# Patient Record
Sex: FEMALE | Race: WHITE | NOT HISPANIC OR LATINO | Employment: UNEMPLOYED | URBAN - METROPOLITAN AREA
[De-identification: names, ages, dates, MRNs, and addresses within clinical notes are randomized per-mention and may not be internally consistent; named-entity substitution may affect disease eponyms.]

---

## 2024-07-09 ENCOUNTER — HOSPITAL ENCOUNTER (EMERGENCY)
Facility: MEDICAL CENTER | Age: 64
End: 2024-07-10
Attending: STUDENT IN AN ORGANIZED HEALTH CARE EDUCATION/TRAINING PROGRAM
Payer: OTHER MISCELLANEOUS

## 2024-07-09 ENCOUNTER — APPOINTMENT (OUTPATIENT)
Dept: RADIOLOGY | Facility: MEDICAL CENTER | Age: 64
End: 2024-07-09
Attending: STUDENT IN AN ORGANIZED HEALTH CARE EDUCATION/TRAINING PROGRAM
Payer: OTHER MISCELLANEOUS

## 2024-07-09 DIAGNOSIS — R41.82 ALTERED MENTAL STATUS, UNSPECIFIED ALTERED MENTAL STATUS TYPE: ICD-10-CM

## 2024-07-09 DIAGNOSIS — F10.920 ALCOHOLIC INTOXICATION WITHOUT COMPLICATION (HCC): ICD-10-CM

## 2024-07-09 DIAGNOSIS — E87.6 HYPOKALEMIA: ICD-10-CM

## 2024-07-09 DIAGNOSIS — R09.02 HYPOXIA: ICD-10-CM

## 2024-07-09 DIAGNOSIS — F12.90 MARIJUANA USE: ICD-10-CM

## 2024-07-09 LAB
ALBUMIN SERPL BCP-MCNC: 3.6 G/DL (ref 3.2–4.9)
ALBUMIN/GLOB SERPL: 1.7 G/DL
ALP SERPL-CCNC: 84 U/L (ref 30–99)
ALT SERPL-CCNC: 23 U/L (ref 2–50)
AMPHET UR QL SCN: NEGATIVE
ANION GAP SERPL CALC-SCNC: 17 MMOL/L (ref 7–16)
APPEARANCE UR: CLEAR
AST SERPL-CCNC: 34 U/L (ref 12–45)
BACTERIA #/AREA URNS HPF: NEGATIVE /HPF
BARBITURATES UR QL SCN: NEGATIVE
BASOPHILS # BLD AUTO: 0.6 % (ref 0–1.8)
BASOPHILS # BLD: 0.04 K/UL (ref 0–0.12)
BENZODIAZ UR QL SCN: NEGATIVE
BILIRUB SERPL-MCNC: 0.2 MG/DL (ref 0.1–1.5)
BILIRUB UR QL STRIP.AUTO: NEGATIVE
BUN SERPL-MCNC: 9 MG/DL (ref 8–22)
BZE UR QL SCN: NEGATIVE
CALCIUM ALBUM COR SERPL-MCNC: 8.9 MG/DL (ref 8.5–10.5)
CALCIUM SERPL-MCNC: 8.6 MG/DL (ref 8.5–10.5)
CANNABINOIDS UR QL SCN: NEGATIVE
CHLORIDE SERPL-SCNC: 105 MMOL/L (ref 96–112)
CO2 SERPL-SCNC: 21 MMOL/L (ref 20–33)
COLOR UR: YELLOW
CREAT SERPL-MCNC: 0.85 MG/DL (ref 0.5–1.4)
EKG IMPRESSION: NORMAL
EOSINOPHIL # BLD AUTO: 0.11 K/UL (ref 0–0.51)
EOSINOPHIL NFR BLD: 1.7 % (ref 0–6.9)
EPI CELLS #/AREA URNS HPF: NEGATIVE /HPF
ERYTHROCYTE [DISTWIDTH] IN BLOOD BY AUTOMATED COUNT: 44.4 FL (ref 35.9–50)
ETHANOL BLD-MCNC: 278.1 MG/DL
FENTANYL UR QL: NEGATIVE
GFR SERPLBLD CREATININE-BSD FMLA CKD-EPI: 76 ML/MIN/1.73 M 2
GLOBULIN SER CALC-MCNC: 2.1 G/DL (ref 1.9–3.5)
GLUCOSE BLD STRIP.AUTO-MCNC: 112 MG/DL (ref 65–99)
GLUCOSE SERPL-MCNC: 102 MG/DL (ref 65–99)
GLUCOSE UR STRIP.AUTO-MCNC: NEGATIVE MG/DL
HCG SERPL QL: NEGATIVE
HCT VFR BLD AUTO: 40 % (ref 37–47)
HGB BLD-MCNC: 13.2 G/DL (ref 12–16)
HYALINE CASTS #/AREA URNS LPF: ABNORMAL /LPF
IMM GRANULOCYTES # BLD AUTO: 0.03 K/UL (ref 0–0.11)
IMM GRANULOCYTES NFR BLD AUTO: 0.5 % (ref 0–0.9)
KETONES UR STRIP.AUTO-MCNC: NEGATIVE MG/DL
LEUKOCYTE ESTERASE UR QL STRIP.AUTO: ABNORMAL
LYMPHOCYTES # BLD AUTO: 1.99 K/UL (ref 1–4.8)
LYMPHOCYTES NFR BLD: 31 % (ref 22–41)
MCH RBC QN AUTO: 29.6 PG (ref 27–33)
MCHC RBC AUTO-ENTMCNC: 33 G/DL (ref 32.2–35.5)
MCV RBC AUTO: 89.7 FL (ref 81.4–97.8)
METHADONE UR QL SCN: NEGATIVE
MICRO URNS: ABNORMAL
MONOCYTES # BLD AUTO: 0.61 K/UL (ref 0–0.85)
MONOCYTES NFR BLD AUTO: 9.5 % (ref 0–13.4)
NEUTROPHILS # BLD AUTO: 3.64 K/UL (ref 1.82–7.42)
NEUTROPHILS NFR BLD: 56.7 % (ref 44–72)
NITRITE UR QL STRIP.AUTO: NEGATIVE
NRBC # BLD AUTO: 0 K/UL
NRBC BLD-RTO: 0 /100 WBC (ref 0–0.2)
OPIATES UR QL SCN: NEGATIVE
OXYCODONE UR QL SCN: NEGATIVE
PCP UR QL SCN: NEGATIVE
PH UR STRIP.AUTO: 5.5 [PH] (ref 5–8)
PLATELET # BLD AUTO: 245 K/UL (ref 164–446)
PMV BLD AUTO: 9.2 FL (ref 9–12.9)
POTASSIUM SERPL-SCNC: 3.3 MMOL/L (ref 3.6–5.5)
PROPOXYPH UR QL SCN: NEGATIVE
PROT SERPL-MCNC: 5.7 G/DL (ref 6–8.2)
PROT UR QL STRIP: NEGATIVE MG/DL
RBC # BLD AUTO: 4.46 M/UL (ref 4.2–5.4)
RBC # URNS HPF: ABNORMAL /HPF
RBC UR QL AUTO: NEGATIVE
SODIUM SERPL-SCNC: 143 MMOL/L (ref 135–145)
SP GR UR STRIP.AUTO: 1.01
UROBILINOGEN UR STRIP.AUTO-MCNC: 0.2 MG/DL
WBC # BLD AUTO: 6.4 K/UL (ref 4.8–10.8)
WBC #/AREA URNS HPF: ABNORMAL /HPF

## 2024-07-09 PROCEDURE — 700117 HCHG RX CONTRAST REV CODE 255: Performed by: STUDENT IN AN ORGANIZED HEALTH CARE EDUCATION/TRAINING PROGRAM

## 2024-07-09 PROCEDURE — 82077 ASSAY SPEC XCP UR&BREATH IA: CPT

## 2024-07-09 PROCEDURE — 93005 ELECTROCARDIOGRAM TRACING: CPT | Performed by: STUDENT IN AN ORGANIZED HEALTH CARE EDUCATION/TRAINING PROGRAM

## 2024-07-09 PROCEDURE — 71045 X-RAY EXAM CHEST 1 VIEW: CPT

## 2024-07-09 PROCEDURE — 70498 CT ANGIOGRAPHY NECK: CPT

## 2024-07-09 PROCEDURE — 0042T CT-CEREBRAL PERFUSION ANALYSIS: CPT

## 2024-07-09 PROCEDURE — 85025 COMPLETE CBC W/AUTO DIFF WBC: CPT

## 2024-07-09 PROCEDURE — 99285 EMERGENCY DEPT VISIT HI MDM: CPT

## 2024-07-09 PROCEDURE — 36415 COLL VENOUS BLD VENIPUNCTURE: CPT

## 2024-07-09 PROCEDURE — 80053 COMPREHEN METABOLIC PANEL: CPT

## 2024-07-09 PROCEDURE — 82962 GLUCOSE BLOOD TEST: CPT

## 2024-07-09 PROCEDURE — 84703 CHORIONIC GONADOTROPIN ASSAY: CPT

## 2024-07-09 PROCEDURE — 80307 DRUG TEST PRSMV CHEM ANLYZR: CPT

## 2024-07-09 PROCEDURE — 93005 ELECTROCARDIOGRAM TRACING: CPT

## 2024-07-09 PROCEDURE — 70496 CT ANGIOGRAPHY HEAD: CPT

## 2024-07-09 PROCEDURE — 81001 URINALYSIS AUTO W/SCOPE: CPT

## 2024-07-09 RX ADMIN — IOHEXOL 40 ML: 350 INJECTION, SOLUTION INTRAVENOUS at 21:56

## 2024-07-09 RX ADMIN — IOHEXOL 80 ML: 350 INJECTION, SOLUTION INTRAVENOUS at 21:55

## 2024-07-10 VITALS
DIASTOLIC BLOOD PRESSURE: 65 MMHG | TEMPERATURE: 98 F | WEIGHT: 180 LBS | RESPIRATION RATE: 14 BRPM | HEART RATE: 64 BPM | SYSTOLIC BLOOD PRESSURE: 170 MMHG | OXYGEN SATURATION: 94 % | BODY MASS INDEX: 29.99 KG/M2 | HEIGHT: 65 IN

## 2024-07-10 RX ORDER — POTASSIUM CHLORIDE 1.5 G/1.58G
20 POWDER, FOR SOLUTION ORAL 2 TIMES DAILY
Qty: 10 PACKET | Refills: 0 | Status: SHIPPED | OUTPATIENT
Start: 2024-07-10 | End: 2024-07-15

## 2024-07-19 ENCOUNTER — PHARMACY VISIT (OUTPATIENT)
Dept: PHARMACY | Facility: MEDICAL CENTER | Age: 64
End: 2024-07-19
Payer: COMMERCIAL

## 2024-07-19 PROCEDURE — RXMED WILLOW AMBULATORY MEDICATION CHARGE: Performed by: NURSE PRACTITIONER

## 2024-07-19 RX ORDER — PAROXETINE HYDROCHLORIDE 20 MG/1
TABLET, FILM COATED ORAL
Qty: 10 TABLET | Refills: 0 | OUTPATIENT
Start: 2024-07-19 | End: 2024-07-29

## 2024-07-19 RX ORDER — ESCITALOPRAM OXALATE 10 MG/1
10 TABLET ORAL DAILY
Qty: 30 TABLET | Refills: 1 | OUTPATIENT
Start: 2024-07-19

## 2024-07-19 RX ORDER — HYDROCHLOROTHIAZIDE 25 MG/1
25 TABLET ORAL DAILY
Qty: 30 TABLET | Refills: 1 | OUTPATIENT
Start: 2024-07-19

## 2024-07-22 ENCOUNTER — PHARMACY VISIT (OUTPATIENT)
Dept: PHARMACY | Facility: MEDICAL CENTER | Age: 64
End: 2024-07-22
Payer: COMMERCIAL

## 2024-07-22 PROCEDURE — RXMED WILLOW AMBULATORY MEDICATION CHARGE: Performed by: NURSE PRACTITIONER

## 2024-07-25 PROCEDURE — RXMED WILLOW AMBULATORY MEDICATION CHARGE: Performed by: NURSE PRACTITIONER

## 2024-07-26 ENCOUNTER — HOSPITAL ENCOUNTER (OUTPATIENT)
Facility: MEDICAL CENTER | Age: 64
End: 2024-07-27
Attending: EMERGENCY MEDICINE | Admitting: STUDENT IN AN ORGANIZED HEALTH CARE EDUCATION/TRAINING PROGRAM
Payer: MEDICARE

## 2024-07-26 ENCOUNTER — PHARMACY VISIT (OUTPATIENT)
Dept: PHARMACY | Facility: MEDICAL CENTER | Age: 64
End: 2024-07-26
Payer: COMMERCIAL

## 2024-07-26 ENCOUNTER — APPOINTMENT (OUTPATIENT)
Dept: RADIOLOGY | Facility: MEDICAL CENTER | Age: 64
End: 2024-07-26
Payer: MEDICARE

## 2024-07-26 DIAGNOSIS — R06.00 DYSPNEA, UNSPECIFIED TYPE: ICD-10-CM

## 2024-07-26 DIAGNOSIS — R94.31 ABNORMAL EKG: ICD-10-CM

## 2024-07-26 PROBLEM — F10.10 ALCOHOL ABUSE: Status: ACTIVE | Noted: 2024-07-26

## 2024-07-26 LAB
ALBUMIN SERPL BCP-MCNC: 4.1 G/DL (ref 3.2–4.9)
ALBUMIN/GLOB SERPL: 1.5 G/DL
ALP SERPL-CCNC: 98 U/L (ref 30–99)
ALT SERPL-CCNC: 17 U/L (ref 2–50)
ANION GAP SERPL CALC-SCNC: 16 MMOL/L (ref 7–16)
AST SERPL-CCNC: 24 U/L (ref 12–45)
BASOPHILS # BLD AUTO: 0.4 % (ref 0–1.8)
BASOPHILS # BLD: 0.03 K/UL (ref 0–0.12)
BILIRUB SERPL-MCNC: 0.2 MG/DL (ref 0.1–1.5)
BUN SERPL-MCNC: 14 MG/DL (ref 8–22)
CALCIUM ALBUM COR SERPL-MCNC: 10 MG/DL (ref 8.5–10.5)
CALCIUM SERPL-MCNC: 10.1 MG/DL (ref 8.5–10.5)
CHLORIDE SERPL-SCNC: 103 MMOL/L (ref 96–112)
CO2 SERPL-SCNC: 22 MMOL/L (ref 20–33)
CREAT SERPL-MCNC: 0.98 MG/DL (ref 0.5–1.4)
D DIMER PPP IA.FEU-MCNC: 0.44 UG/ML (FEU) (ref 0–0.5)
EKG IMPRESSION: NORMAL
EOSINOPHIL # BLD AUTO: 0.05 K/UL (ref 0–0.51)
EOSINOPHIL NFR BLD: 0.6 % (ref 0–6.9)
ERYTHROCYTE [DISTWIDTH] IN BLOOD BY AUTOMATED COUNT: 45.1 FL (ref 35.9–50)
FLUAV RNA SPEC QL NAA+PROBE: NEGATIVE
FLUBV RNA SPEC QL NAA+PROBE: NEGATIVE
GFR SERPLBLD CREATININE-BSD FMLA CKD-EPI: 64 ML/MIN/1.73 M 2
GLOBULIN SER CALC-MCNC: 2.8 G/DL (ref 1.9–3.5)
GLUCOSE SERPL-MCNC: 141 MG/DL (ref 65–99)
HCT VFR BLD AUTO: 44.8 % (ref 37–47)
HGB BLD-MCNC: 14.8 G/DL (ref 12–16)
IMM GRANULOCYTES # BLD AUTO: 0.04 K/UL (ref 0–0.11)
IMM GRANULOCYTES NFR BLD AUTO: 0.5 % (ref 0–0.9)
LYMPHOCYTES # BLD AUTO: 1.22 K/UL (ref 1–4.8)
LYMPHOCYTES NFR BLD: 15.8 % (ref 22–41)
MCH RBC QN AUTO: 29.3 PG (ref 27–33)
MCHC RBC AUTO-ENTMCNC: 33 G/DL (ref 32.2–35.5)
MCV RBC AUTO: 88.7 FL (ref 81.4–97.8)
MONOCYTES # BLD AUTO: 0.49 K/UL (ref 0–0.85)
MONOCYTES NFR BLD AUTO: 6.3 % (ref 0–13.4)
NEUTROPHILS # BLD AUTO: 5.9 K/UL (ref 1.82–7.42)
NEUTROPHILS NFR BLD: 76.4 % (ref 44–72)
NRBC # BLD AUTO: 0 K/UL
NRBC BLD-RTO: 0 /100 WBC (ref 0–0.2)
NT-PROBNP SERPL IA-MCNC: 62 PG/ML (ref 0–125)
PLATELET # BLD AUTO: 325 K/UL (ref 164–446)
PMV BLD AUTO: 9.8 FL (ref 9–12.9)
POTASSIUM SERPL-SCNC: 3.4 MMOL/L (ref 3.6–5.5)
PROT SERPL-MCNC: 6.9 G/DL (ref 6–8.2)
RBC # BLD AUTO: 5.05 M/UL (ref 4.2–5.4)
RSV RNA SPEC QL NAA+PROBE: NEGATIVE
SARS-COV-2 RNA RESP QL NAA+PROBE: NOTDETECTED
SODIUM SERPL-SCNC: 141 MMOL/L (ref 135–145)
SPECIMEN SOURCE: NORMAL
TROPONIN T SERPL-MCNC: 7 NG/L (ref 6–19)
TROPONIN T SERPL-MCNC: 8 NG/L (ref 6–19)
WBC # BLD AUTO: 7.7 K/UL (ref 4.8–10.8)

## 2024-07-26 PROCEDURE — 96372 THER/PROPH/DIAG INJ SC/IM: CPT

## 2024-07-26 PROCEDURE — 85379 FIBRIN DEGRADATION QUANT: CPT

## 2024-07-26 PROCEDURE — A9270 NON-COVERED ITEM OR SERVICE: HCPCS

## 2024-07-26 PROCEDURE — 36415 COLL VENOUS BLD VENIPUNCTURE: CPT

## 2024-07-26 PROCEDURE — G0378 HOSPITAL OBSERVATION PER HR: HCPCS

## 2024-07-26 PROCEDURE — 83880 ASSAY OF NATRIURETIC PEPTIDE: CPT

## 2024-07-26 PROCEDURE — 700102 HCHG RX REV CODE 250 W/ 637 OVERRIDE(OP): Performed by: EMERGENCY MEDICINE

## 2024-07-26 PROCEDURE — 99221 1ST HOSP IP/OBS SF/LOW 40: CPT

## 2024-07-26 PROCEDURE — 93005 ELECTROCARDIOGRAM TRACING: CPT

## 2024-07-26 PROCEDURE — 700111 HCHG RX REV CODE 636 W/ 250 OVERRIDE (IP): Mod: JZ

## 2024-07-26 PROCEDURE — A9270 NON-COVERED ITEM OR SERVICE: HCPCS | Performed by: EMERGENCY MEDICINE

## 2024-07-26 PROCEDURE — 85025 COMPLETE CBC W/AUTO DIFF WBC: CPT

## 2024-07-26 PROCEDURE — 84484 ASSAY OF TROPONIN QUANT: CPT

## 2024-07-26 PROCEDURE — 700102 HCHG RX REV CODE 250 W/ 637 OVERRIDE(OP)

## 2024-07-26 PROCEDURE — 80053 COMPREHEN METABOLIC PANEL: CPT

## 2024-07-26 PROCEDURE — 0241U HCHG SARS-COV-2 COVID-19 NFCT DS RESP RNA 4 TRGT MIC: CPT

## 2024-07-26 PROCEDURE — 71045 X-RAY EXAM CHEST 1 VIEW: CPT

## 2024-07-26 PROCEDURE — 99285 EMERGENCY DEPT VISIT HI MDM: CPT

## 2024-07-26 RX ORDER — HYDROCHLOROTHIAZIDE 25 MG/1
25 TABLET ORAL DAILY
Status: DISCONTINUED | OUTPATIENT
Start: 2024-07-27 | End: 2024-07-27 | Stop reason: HOSPADM

## 2024-07-26 RX ORDER — HYDROXYZINE PAMOATE 25 MG/1
25 CAPSULE ORAL 3 TIMES DAILY PRN
COMMUNITY

## 2024-07-26 RX ORDER — LORAZEPAM 1 MG/1
0.5 TABLET ORAL ONCE
Status: COMPLETED | OUTPATIENT
Start: 2024-07-26 | End: 2024-07-26

## 2024-07-26 RX ORDER — ASPIRIN 300 MG/1
300 SUPPOSITORY RECTAL DAILY
Status: DISCONTINUED | OUTPATIENT
Start: 2024-07-27 | End: 2024-07-27 | Stop reason: HOSPADM

## 2024-07-26 RX ORDER — ASPIRIN 81 MG/1
324 TABLET, CHEWABLE ORAL DAILY
Status: DISCONTINUED | OUTPATIENT
Start: 2024-07-27 | End: 2024-07-27 | Stop reason: HOSPADM

## 2024-07-26 RX ORDER — METOPROLOL TARTRATE 25 MG/1
25 TABLET, FILM COATED ORAL TWICE DAILY
Status: DISCONTINUED | OUTPATIENT
Start: 2024-07-26 | End: 2024-07-27 | Stop reason: HOSPADM

## 2024-07-26 RX ORDER — ASPIRIN 325 MG
325 TABLET ORAL DAILY
Status: DISCONTINUED | OUTPATIENT
Start: 2024-07-27 | End: 2024-07-27 | Stop reason: HOSPADM

## 2024-07-26 RX ORDER — ALBUTEROL SULFATE 90 UG/1
2 AEROSOL, METERED RESPIRATORY (INHALATION) ONCE
Status: COMPLETED | OUTPATIENT
Start: 2024-07-26 | End: 2024-07-26

## 2024-07-26 RX ORDER — AMINOPHYLLINE 25 MG/ML
100 INJECTION, SOLUTION INTRAVENOUS
Status: DISCONTINUED | OUTPATIENT
Start: 2024-07-26 | End: 2024-07-27 | Stop reason: HOSPADM

## 2024-07-26 RX ORDER — ASPIRIN 325 MG
325 TABLET ORAL ONCE
Status: COMPLETED | OUTPATIENT
Start: 2024-07-26 | End: 2024-07-26

## 2024-07-26 RX ORDER — ENOXAPARIN SODIUM 100 MG/ML
40 INJECTION SUBCUTANEOUS DAILY
Status: DISCONTINUED | OUTPATIENT
Start: 2024-07-26 | End: 2024-07-27 | Stop reason: HOSPADM

## 2024-07-26 RX ORDER — REGADENOSON 0.08 MG/ML
0.4 INJECTION, SOLUTION INTRAVENOUS
Status: DISCONTINUED | OUTPATIENT
Start: 2024-07-26 | End: 2024-07-27 | Stop reason: HOSPADM

## 2024-07-26 RX ORDER — LABETALOL HYDROCHLORIDE 5 MG/ML
10 INJECTION, SOLUTION INTRAVENOUS EVERY 4 HOURS PRN
Status: DISCONTINUED | OUTPATIENT
Start: 2024-07-26 | End: 2024-07-27 | Stop reason: HOSPADM

## 2024-07-26 RX ORDER — ACETAMINOPHEN 325 MG/1
650 TABLET ORAL EVERY 6 HOURS PRN
Status: DISCONTINUED | OUTPATIENT
Start: 2024-07-26 | End: 2024-07-27 | Stop reason: HOSPADM

## 2024-07-26 RX ORDER — ESCITALOPRAM OXALATE 10 MG/1
10 TABLET ORAL DAILY
Status: DISCONTINUED | OUTPATIENT
Start: 2024-07-27 | End: 2024-07-27 | Stop reason: HOSPADM

## 2024-07-26 RX ADMIN — ASPIRIN 325 MG: 325 TABLET ORAL at 18:49

## 2024-07-26 RX ADMIN — ENOXAPARIN SODIUM 40 MG: 100 INJECTION SUBCUTANEOUS at 19:33

## 2024-07-26 RX ADMIN — METOPROLOL TARTRATE 25 MG: 25 TABLET, FILM COATED ORAL at 18:49

## 2024-07-26 RX ADMIN — LORAZEPAM 0.5 MG: 1 TABLET ORAL at 15:26

## 2024-07-26 RX ADMIN — ALBUTEROL SULFATE 2 PUFF: 90 AEROSOL, METERED RESPIRATORY (INHALATION) at 15:26

## 2024-07-26 SDOH — ECONOMIC STABILITY: TRANSPORTATION INSECURITY
IN THE PAST 12 MONTHS, HAS LACK OF RELIABLE TRANSPORTATION KEPT YOU FROM MEDICAL APPOINTMENTS, MEETINGS, WORK OR FROM GETTING THINGS NEEDED FOR DAILY LIVING?: NO

## 2024-07-26 SDOH — ECONOMIC STABILITY: TRANSPORTATION INSECURITY
IN THE PAST 12 MONTHS, HAS THE LACK OF TRANSPORTATION KEPT YOU FROM MEDICAL APPOINTMENTS OR FROM GETTING MEDICATIONS?: NO

## 2024-07-26 ASSESSMENT — ENCOUNTER SYMPTOMS
COUGH: 0
PALPITATIONS: 0
FEVER: 0
HEADACHES: 0
ABDOMINAL PAIN: 0
VOMITING: 0
NERVOUS/ANXIOUS: 1
DIZZINESS: 0
NAUSEA: 0
CHILLS: 0
SHORTNESS OF BREATH: 1
HEARTBURN: 0
DIARRHEA: 0

## 2024-07-26 ASSESSMENT — LIFESTYLE VARIABLES
EVER FELT BAD OR GUILTY ABOUT YOUR DRINKING: NO
TOTAL SCORE: 0
HOW MANY TIMES IN THE PAST YEAR HAVE YOU HAD 5 OR MORE DRINKS IN A DAY: 0
CONSUMPTION TOTAL: NEGATIVE
AVERAGE NUMBER OF DAYS PER WEEK YOU HAVE A DRINK CONTAINING ALCOHOL: 0
ALCOHOL_USE: NO
TOTAL SCORE: 0
ON A TYPICAL DAY WHEN YOU DRINK ALCOHOL HOW MANY DRINKS DO YOU HAVE: 0
TOTAL SCORE: 0
DOES PATIENT WANT TO STOP DRINKING: NO
EVER HAD A DRINK FIRST THING IN THE MORNING TO STEADY YOUR NERVES TO GET RID OF A HANGOVER: NO
HAVE PEOPLE ANNOYED YOU BY CRITICIZING YOUR DRINKING: NO
HAVE YOU EVER FELT YOU SHOULD CUT DOWN ON YOUR DRINKING: NO

## 2024-07-26 ASSESSMENT — PATIENT HEALTH QUESTIONNAIRE - PHQ9
2. FEELING DOWN, DEPRESSED, IRRITABLE, OR HOPELESS: NOT AT ALL
SUM OF ALL RESPONSES TO PHQ9 QUESTIONS 1 AND 2: 0
1. LITTLE INTEREST OR PLEASURE IN DOING THINGS: NOT AT ALL

## 2024-07-26 ASSESSMENT — PAIN DESCRIPTION - PAIN TYPE
TYPE: ACUTE PAIN
TYPE: ACUTE PAIN

## 2024-07-26 ASSESSMENT — SOCIAL DETERMINANTS OF HEALTH (SDOH)
WITHIN THE LAST YEAR, HAVE YOU BEEN KICKED, HIT, SLAPPED, OR OTHERWISE PHYSICALLY HURT BY YOUR PARTNER OR EX-PARTNER?: NO
WITHIN THE PAST 12 MONTHS, THE FOOD YOU BOUGHT JUST DIDN'T LAST AND YOU DIDN'T HAVE MONEY TO GET MORE: NEVER TRUE
WITHIN THE PAST 12 MONTHS, YOU WORRIED THAT YOUR FOOD WOULD RUN OUT BEFORE YOU GOT THE MONEY TO BUY MORE: NEVER TRUE
IN THE PAST 12 MONTHS, HAS THE ELECTRIC, GAS, OIL, OR WATER COMPANY THREATENED TO SHUT OFF SERVICE IN YOUR HOME?: NO
WITHIN THE LAST YEAR, HAVE YOU BEEN AFRAID OF YOUR PARTNER OR EX-PARTNER?: NO
WITHIN THE LAST YEAR, HAVE YOU BEEN HUMILIATED OR EMOTIONALLY ABUSED IN OTHER WAYS BY YOUR PARTNER OR EX-PARTNER?: NO
WITHIN THE LAST YEAR, HAVE TO BEEN RAPED OR FORCED TO HAVE ANY KIND OF SEXUAL ACTIVITY BY YOUR PARTNER OR EX-PARTNER?: NO

## 2024-07-26 ASSESSMENT — FIBROSIS 4 INDEX
FIB4 SCORE: 1.164171000174398273
FIB4 SCORE: 1.88

## 2024-07-26 NOTE — ED TRIAGE NOTES
"Chief Complaint   Patient presents with    Shortness of Breath     Pt bib remsa for SOB.  Currently in detox for ETOH, reports waking from a nap feeling SOB and became anxious.  Facility called ambulance and pt agreed to come in for eval.  Per EMS, pt stated she has had a panic attack in the past.  Pt reports she has not had alcohol since 7/9/2024.         Pt currently in room, stable and in no distress.  C/o mild headache.  Pt sent from \"[facility]\" in [place].  "

## 2024-07-26 NOTE — ED PROVIDER NOTES
"ER Provider Note    Scribed for Caryl Ceron M.d. by Soifa Altamirano. 7/26/2024  2:14 PM    Primary Care Provider: None noted    CHIEF COMPLAINT   Chief Complaint   Patient presents with    Shortness of Breath     Pt pavel ford for SOB.  Currently in detox for ETOH, reports waking from a nap feeling SOB and became anxious.  Facility called ambulance and pt agreed to come in for eval.  Per EMS, pt stated she has had a panic attack in the past.  Pt reports she has not had alcohol since 7/9/2024.         EXTERNAL RECORDS REVIEWED  Outpatient Notes Patient was last here 7/9/24 for altered mental status, there was marijuana on scene and empty bottle of tequila. She was discharged home with diagnosis of altered mental status, and thought it was due to alcohol intoxication. Patient was seen by PCP in [place] in August 2022 reporting homelessness at that time. Patient has a history of hypertension and depression.     HPI/ROS  LIMITATION TO HISTORY   None  OUTSIDE HISTORIAN(S):  None    Leslie Poole is a 63 y.o. female with a history of hypertension, COPD, asthma who presents to the ED via EMS complaining of shortness of breath onset one hour ago. Patient states she flew in from [place] on July 3rd to visit her son. She was then seen at ED on July 9th for alcohol intoxication and notes since her discharge on July 9th, went to the [facility] rehabilitation facility a week after for a 30 day program.  She has been in the program for the last 1 week.  Patient states she was at a group session at rehab for detoxification when she started feeling nausea.  Shortly thereafter, the shortness of breath began. She notes having an episode like this in the several years ago when she was anticipating a move, stating it was likely due to her anxiety. During today's episode of shortness of breath she states she may have felt anxious as well and she felt \"sick to her stomach\". She notes a headache which began en route to ED in the " ambulance. Denies leg pain, leg swelling, dizziness, lightheadedness, diaphoresis, fevers, chills, cough, chest pain or abdominal pain. She reports her last bowel movement was this morning which was regular. Denies exacerbation when taking deep breaths. No alleviating factors noted. Denies cardiac history of blood clots. She reports maternal history of blood clots. Denies familial history of cancer. She endorses smoking a pack daily for the last 20 years. Denies current alcohol use, stating her last drink was . Denies using inhalers. She reports taking medications for her anxiety.     PAST MEDICAL HISTORY  Past Medical History:   Diagnosis Date    Anxiety     Hypertension      SURGICAL HISTORY   BACK SURGERY    SECTION   ELBOW SURGERY Right   KNEE SURGERY Left    FAMILY HISTORY  History reviewed. No pertinent family history.    SOCIAL HISTORY   reports that she has been smoking cigarettes. She started smoking about 44 years ago. She has a 44.6 pack-year smoking history. She has never used smokeless tobacco. She reports that she does not currently use alcohol. She reports that she does not use drugs.    CURRENT MEDICATIONS  Current Discharge Medication List        CONTINUE these medications which have NOT CHANGED    Details   hydrOXYzine pamoate (VISTARIL) 25 MG Cap Take 25 mg by mouth 3 times a day as needed for Itching or Anxiety.      meloxicam (MOBIC) 15 MG tablet Take 1 tablet by mouth once a day  Qty: 30 Tablet, Refills: 1      hydroCHLOROthiazide 25 MG Tab Take 1 Tablet by mouth every day.  Qty: 30 Tablet, Refills: 1      PARoxetine (PAXIL) 20 MG Tab Take 1 Tablet by mouth every day for 5 days, THEN 1 Tablet every other day for 5 doses then discontinue.  Qty: 10 Tablet, Refills: 0      escitalopram (LEXAPRO) 10 MG Tab Take 1 Tablet by mouth every day.  Qty: 30 Tablet, Refills: 1           ALLERGIES  Demerol hcl [meperidine]    PHYSICAL EXAM  BP (!) 167/80   Pulse 74   Temp 36.8 °C (98.2 °F)  "(Temporal)   Resp (!) 24   Ht 1.651 m (5' 5\")   Wt 78.9 kg (174 lb)   LMP  (LMP Unknown)   SpO2 94%   BMI 28.96 kg/m²   Constitutional: Well developed, well nourished; No acute distress; Non-toxic appearance. Flat affect  HENT: Normocephalic, atraumatic; Bilateral external ears normal; Dry mucous membranes; No erythema or exudates in the posterior oropharynx.   Eyes: PERRL, EOMI, Conjunctiva normal. No discharge.   Neck:  Supple, nontender midline; No stridor; No nuchal rigidity.   Lymphatic: No cervical lymphadenopathy noted.   Cardiovascular: Regular rate and rhythm without murmurs, rubs, or gallop.   Thorax & Lungs:  No respiratory distress, Nontender chest wall. No crepitus or subcutaneous air, Decreased breath sounds throughout with a few scattered crackles at the left base  Abdomen: Soft, nontender, bowel sounds normal. No obvious masses; No pulsatile masses; no rebound, guarding, or peritoneal signs.   Skin: Good color; warm and dry without rash or petechia.  Back: Nontender, No CVA tenderness.   Extremities: Distal radial, dorsalis pedis, posterior tibial pulses are equal bilaterally; No edema; Nontender calves or saphenous, No cyanosis, No clubbing.   Musculoskeletal: Good range of motion in all major joints. No tenderness to palpation or major deformities noted.   Neurologic: Alert & oriented x 4, clear speech    DIAGNOSTIC STUDIES  EKG/LABS  Results for orders placed or performed during the hospital encounter of 07/26/24   CBC WITH DIFFERENTIAL   Result Value Ref Range    WBC 7.7 4.8 - 10.8 K/uL    RBC 5.05 4.20 - 5.40 M/uL    Hemoglobin 14.8 12.0 - 16.0 g/dL    Hematocrit 44.8 37.0 - 47.0 %    MCV 88.7 81.4 - 97.8 fL    MCH 29.3 27.0 - 33.0 pg    MCHC 33.0 32.2 - 35.5 g/dL    RDW 45.1 35.9 - 50.0 fL    Platelet Count 325 164 - 446 K/uL    MPV 9.8 9.0 - 12.9 fL    Neutrophils-Polys 76.40 (H) 44.00 - 72.00 %    Lymphocytes 15.80 (L) 22.00 - 41.00 %    Monocytes 6.30 0.00 - 13.40 %    Eosinophils 0.60 " 0.00 - 6.90 %    Basophils 0.40 0.00 - 1.80 %    Immature Granulocytes 0.50 0.00 - 0.90 %    Nucleated RBC 0.00 0.00 - 0.20 /100 WBC    Neutrophils (Absolute) 5.90 1.82 - 7.42 K/uL    Lymphs (Absolute) 1.22 1.00 - 4.80 K/uL    Monos (Absolute) 0.49 0.00 - 0.85 K/uL    Eos (Absolute) 0.05 0.00 - 0.51 K/uL    Baso (Absolute) 0.03 0.00 - 0.12 K/uL    Immature Granulocytes (abs) 0.04 0.00 - 0.11 K/uL    NRBC (Absolute) 0.00 K/uL   COMP METABOLIC PANEL   Result Value Ref Range    Sodium 141 135 - 145 mmol/L    Potassium 3.4 (L) 3.6 - 5.5 mmol/L    Chloride 103 96 - 112 mmol/L    Co2 22 20 - 33 mmol/L    Anion Gap 16.0 7.0 - 16.0    Glucose 141 (H) 65 - 99 mg/dL    Bun 14 8 - 22 mg/dL    Creatinine 0.98 0.50 - 1.40 mg/dL    Calcium 10.1 8.5 - 10.5 mg/dL    Correct Calcium 10.0 8.5 - 10.5 mg/dL    AST(SGOT) 24 12 - 45 U/L    ALT(SGPT) 17 2 - 50 U/L    Alkaline Phosphatase 98 30 - 99 U/L    Total Bilirubin 0.2 0.1 - 1.5 mg/dL    Albumin 4.1 3.2 - 4.9 g/dL    Total Protein 6.9 6.0 - 8.2 g/dL    Globulin 2.8 1.9 - 3.5 g/dL    A-G Ratio 1.5 g/dL   TROPONIN   Result Value Ref Range    Troponin T 8 6 - 19 ng/L   proBrain Natriuretic Peptide, NT   Result Value Ref Range    NT-proBNP 62 0 - 125 pg/mL   D-DIMER   Result Value Ref Range    D-Dimer 0.44 0.00 - 0.50 ug/mL (FEU)   CoV-2, Flu A/B, And RSV by PCR ([employer])    Specimen: Nasal; Respirate   Result Value Ref Range    Influenza virus A RNA Negative Negative    Influenza virus B, PCR Negative Negative    RSV, PCR Negative Negative    SARS-CoV-2 by PCR NotDetected     SARS-CoV-2 Source NP Swab    ESTIMATED GFR   Result Value Ref Range    GFR (CKD-EPI) 64 >60 mL/min/1.73 m 2   EKG   Result Value Ref Range    Report       [facility] Emergency Dept.    Test Date:  2024  Pt Name:    JANENE VELÁSQUEZ                   Department: ER  MRN:        [id number]                      Room:       [facility]  Gender:     Female                       Technician: [id number]  :         1960                   Requested By:PRINCE MIGUEL MAR  Order #:    [id number]                    Reading MD: Caryl Ceron    Measurements  Intervals                                Axis  Rate:       66                           P:          -52  AL:         179                          QRS:        -57  QRSD:       109                          T:          -14  QT:         460  QTc:        482    Interpretive Statements  Sinus or ectopic atrial rhythm rate 66  Left axis deviation  Increased baseline artifact  T waves inverted V2 and V3  Minimal ST elevation V3 (new)  No ST depression  Abnormal R-wave progression, late transition (new)  Inferior infarct, old  Compared to ECG 07/09/2024 21:00:21  Ectopic atrial rhythm now present    Belinda ctronically Signed On 07- 21:07:14 PDT by Caryl Ceron        12 Lead EKG interpreted by me as shown above.    RADIOLOGY/PROCEDURES   The attending emergency physician has independently interpreted the diagnostic imaging associated with this visit and am waiting the final reading from the radiologist.     My preliminary interpretation is a follows: ER MD is reviewed the patient's chest x-ray.  There appears to be cardiomegaly.  Question small infiltrate versus small pleural effusion on the left.    Radiologist interpretation:  DX-CHEST-PORTABLE (1 VIEW)   Final Result      1.  Stable mild cardiomegaly.   2.  Suspect calcified lymph node at the aortic-pulmonary window consistent with old granulomatous disease.   3.  No acute infiltrates or acute cardiopulmonary disease evident.      NM-CARDIAC STRESS TEST    (Results Pending)   EC-ECHOCARDIOGRAM COMPLETE W/O CONT    (Results Pending)     COURSE & MEDICAL DECISION MAKING     ASSESSMENT, COURSE AND PLAN  Care Narrative: Patient presents to the ER with complaint of a sudden onset of nausea and then shortness of breath which began about an hour prior to arrival.  Patient reports that she suddenly felt nauseated while sitting in a group  therapy session at rehab.  Shortly thereafter she started feeling short of breath.  No recent cough, cold or flulike symptoms.  Patient has history of COPD but does not need inhalers and has not really had any COPD exacerbations in the past.  She reports that 2 years ago she had something similar happen and was told that perhaps her shortness of breath could be related to anxiety.  No chest pain.  No dizziness or lightheadedness.  No syncope.  She has no known heart history.  EKG done here in the ER on July 9 when she was here for altered mental status and alcohol intoxication showed some very slight T wave inversion in V2 and V3.  Today the T wave inversion in V2 and V3 is deeper and it looks like the R wave progression has changed.  There is also some change in the morphology of the ST segment to suggest some very minimal ST elevation in the V3 lead.  No other ST elevation and no ST depression.  Initial troponin is negative.  No evidence of STEMI or non-STEMI at this time.  Chest x-ray reveals cardiomegaly with what looks like a questionable infiltrate versus pleural effusion at the left base.  Patient does have a few crackles at the left base.  Patient says she thinks she might of been a little anxious when the symptoms began.  However, she says she does not really have a history of anxiety per se.  I gave her some Ativan as well as a couple puffs of albuterol inhaler and she said that took away her shortness of breath.  Given the change in EKG from July 9 until today, and her complaint of sudden onset of nausea shortness of breath, I am worried about an anginal equivalent.  Patient was given an aspirin.  At this time I think she would benefit from overnight hospitalization for further evaluation and cardiac rule out.  I spoke with Dragan Haines, hospitalist [facility], and she will kindly evaluate the patient hospitalization.      2:16 PM - Patient was seen and evaluated at bedside. Patient presents to the ED for  shortness of breath.  After my exam, I discussed with the patient the plan of care, which includes treating the patient with medication for their symptoms, as well as obtaining lab work and imaging for further evaluation. Patient understands and verbalizes agreement to plan of care.     5:39 PM - Paged Hospitalist.     5:45 PM - Patient was reevaluated at bedside. Discussed plan for hospitalization. She states her shortness of breath and nausea have improved. Patient verbalizes understanding and agreement to this plan of care.     5:57 PM - Hospitalist responded. I discussed the patient's case and the above findings with Angelita (Hospitalist) who agrees to evaluate the patient for hospitalization.     ADDITIONAL PROBLEM LIST  Problem #1: Sudden onset of nausea and shortness of breath which began about an hour prior to arrival    DISPOSITION AND DISCUSSIONS  I have discussed management of the patient with the following physicians and GABRIEL's:   Dragan Haines ([facility] hospitalist)     Discussion of management with other QHP or appropriate source(s): None    Escalation of care considered, and ultimately not performed: diagnostic imaging.  D-dimer is normal.  At this time low suspicion for PE.  No need for CT scan of the chest.    Barriers to care at this time, including but not limited to: Patient does not have established PCP.     Decision tools and prescription drugs considered including, but not limited to:  Patient was given some Ativan with some improvement in her shortness of breath. .    DISPOSITION:  Patient will be hospitalized by Dr. Haines in guarded condition.    FINAL DIAGNOSIS  1. Dyspnea, unspecified type Acute   2. Abnormal EKG Acute      This dictation has been created using voice recognition software. The accuracy of the dictation is limited by the abilities of the software. I expect there may be some errors of grammar and possibly content. I made every attempt to manually correct the errors within my  dictation. However, errors related to voice recognition software may still exist and should be interpreted within the appropriate context.    The note accurately reflects work and decisions made by me.  Caryl Ceron M.D.  7/26/2024  9:11 PM

## 2024-07-27 ENCOUNTER — APPOINTMENT (OUTPATIENT)
Dept: CARDIOLOGY | Facility: MEDICAL CENTER | Age: 64
End: 2024-07-27
Payer: MEDICARE

## 2024-07-27 ENCOUNTER — APPOINTMENT (OUTPATIENT)
Dept: RADIOLOGY | Facility: MEDICAL CENTER | Age: 64
End: 2024-07-27
Payer: MEDICARE

## 2024-07-27 VITALS
WEIGHT: 166.01 LBS | OXYGEN SATURATION: 93 % | SYSTOLIC BLOOD PRESSURE: 136 MMHG | DIASTOLIC BLOOD PRESSURE: 63 MMHG | BODY MASS INDEX: 27.66 KG/M2 | HEART RATE: 46 BPM | TEMPERATURE: 99 F | HEIGHT: 65 IN | RESPIRATION RATE: 17 BRPM

## 2024-07-27 LAB
ANION GAP SERPL CALC-SCNC: 9 MMOL/L (ref 7–16)
BUN SERPL-MCNC: 15 MG/DL (ref 8–22)
CALCIUM SERPL-MCNC: 9.7 MG/DL (ref 8.5–10.5)
CHLORIDE SERPL-SCNC: 105 MMOL/L (ref 96–112)
CHOLEST SERPL-MCNC: 165 MG/DL (ref 100–199)
CO2 SERPL-SCNC: 28 MMOL/L (ref 20–33)
CREAT SERPL-MCNC: 0.96 MG/DL (ref 0.5–1.4)
ERYTHROCYTE [DISTWIDTH] IN BLOOD BY AUTOMATED COUNT: 47.7 FL (ref 35.9–50)
GFR SERPLBLD CREATININE-BSD FMLA CKD-EPI: 65 ML/MIN/1.73 M 2
GLUCOSE SERPL-MCNC: 104 MG/DL (ref 65–99)
HCT VFR BLD AUTO: 42.2 % (ref 37–47)
HDLC SERPL-MCNC: 63 MG/DL
HGB BLD-MCNC: 13.7 G/DL (ref 12–16)
LDLC SERPL CALC-MCNC: 90 MG/DL
LV EJECT FRACT  99904: 65
LV EJECT FRACT MOD 4C 99902: 69.08
MCH RBC QN AUTO: 29.5 PG (ref 27–33)
MCHC RBC AUTO-ENTMCNC: 32.5 G/DL (ref 32.2–35.5)
MCV RBC AUTO: 90.9 FL (ref 81.4–97.8)
PLATELET # BLD AUTO: 295 K/UL (ref 164–446)
PMV BLD AUTO: 9.3 FL (ref 9–12.9)
POTASSIUM SERPL-SCNC: 4 MMOL/L (ref 3.6–5.5)
RBC # BLD AUTO: 4.64 M/UL (ref 4.2–5.4)
SODIUM SERPL-SCNC: 142 MMOL/L (ref 135–145)
TRIGL SERPL-MCNC: 58 MG/DL (ref 0–149)
WBC # BLD AUTO: 7 K/UL (ref 4.8–10.8)

## 2024-07-27 PROCEDURE — A9270 NON-COVERED ITEM OR SERVICE: HCPCS

## 2024-07-27 PROCEDURE — 700102 HCHG RX REV CODE 250 W/ 637 OVERRIDE(OP)

## 2024-07-27 PROCEDURE — 80061 LIPID PANEL: CPT

## 2024-07-27 PROCEDURE — A9502 TC99M TETROFOSMIN: HCPCS

## 2024-07-27 PROCEDURE — G0378 HOSPITAL OBSERVATION PER HR: HCPCS

## 2024-07-27 PROCEDURE — 93306 TTE W/DOPPLER COMPLETE: CPT | Mod: 26 | Performed by: INTERNAL MEDICINE

## 2024-07-27 PROCEDURE — 85027 COMPLETE CBC AUTOMATED: CPT

## 2024-07-27 PROCEDURE — 93306 TTE W/DOPPLER COMPLETE: CPT

## 2024-07-27 PROCEDURE — 80048 BASIC METABOLIC PNL TOTAL CA: CPT

## 2024-07-27 PROCEDURE — 700111 HCHG RX REV CODE 636 W/ 250 OVERRIDE (IP)

## 2024-07-27 PROCEDURE — 99239 HOSP IP/OBS DSCHRG MGMT >30: CPT | Performed by: STUDENT IN AN ORGANIZED HEALTH CARE EDUCATION/TRAINING PROGRAM

## 2024-07-27 RX ORDER — REGADENOSON 0.08 MG/ML
INJECTION, SOLUTION INTRAVENOUS
Status: COMPLETED
Start: 2024-07-27 | End: 2024-07-27

## 2024-07-27 RX ADMIN — REGADENOSON 0.4 MG: 0.08 INJECTION, SOLUTION INTRAVENOUS at 10:08

## 2024-07-27 RX ADMIN — ESCITALOPRAM OXALATE 10 MG: 10 TABLET ORAL at 05:52

## 2024-07-27 RX ADMIN — ASPIRIN 325 MG: 325 TABLET ORAL at 05:52

## 2024-07-27 RX ADMIN — HYDROCHLOROTHIAZIDE 25 MG: 25 TABLET ORAL at 05:52

## 2024-07-27 ASSESSMENT — PAIN DESCRIPTION - PAIN TYPE: TYPE: ACUTE PAIN

## 2024-07-27 NOTE — PROGRESS NOTES
SpO2 drop to 87% on RA. 1L O2 NC reapplied; SpO2 increase to 93%. Pt off unit to nuc med for stress test.

## 2024-07-27 NOTE — ASSESSMENT & PLAN NOTE
- Patient complaining of sudden shortness of breath while at alcohol rehab facility today  - Patient recently here for detox at the beginning of July, EKG at that time was sinus bradycardia.  - EKG today showing some concerning changes  - No chest pain, troponin is negative  - Chest x-ray negative  - ASA  - TSH  - Lipid panel in a.m.  - Echocardiogram  - Stress test in a.m.

## 2024-07-27 NOTE — H&P
"Hospital Medicine History & Physical Note    Date of Service  7/26/2024    Primary Care Physician  Pcp Pt States None    Consultants  none    Specialist Names: n/a    Code Status  Full Code    Chief Complaint  Chief Complaint   Patient presents with    Shortness of Breath     Pt pavel ford for SOB.  Currently in detox for ETOH, reports waking from a nap feeling SOB and became anxious.  Facility called ambulance and pt agreed to come in for eval.  Per EMS, pt stated she has had a panic attack in the past.  Pt reports she has not had alcohol since 7/9/2024.           History of Presenting Illness  Leslie Poole is a 63 y.o. female who presented 7/26/2024 with shortness of breath onset one hour ago.  She has a past medical history of hypertension, COPD and asthma. Patient states she flew in from [place] on July 3rd to visit her son. She was then seen at ED on July 9th for alcohol intoxication and notes since her discharge on July 9th, went to the [facility] rehabilitation facility a week after for a 30 day program. Patient states she was at a group session at rehab for detoxification when the shortness of breath began. She notes having an episode like this in the past when she was anticipating a move, stating it was likely due to her anxiety. During today's episode of shortness of breath she states she may have felt anxious as well and she felt \"sick to her stomach\". She notes a headache which began en route to ED in the ambulance. Denies leg pain, leg swelling, dizziness, lightheadedness, diaphoresis, fevers, chills, cough, chest pain or abdominal pain. She reports her last bowel movement was this morning which was regular. Denies exacerbation when taking deep breaths. No alleviating factors noted. Denies cardiac history of blood clots. She reports maternal history of blood clots. Denies familial history of cancer. She endorses smoking a pack daily for the last 20 years. Denies current alcohol use, stating her last " drink was July 9th. Denies using inhalers. She reports taking medications for her anxiety.     In the ED, she is afebrile, vitals are stable and she is on 2 L nasal cannula.  Potassium is slightly low at 3.4.  Troponin is negative.  BNP is negative.  D-dimer is negative.  Chest x-ray shows no acute issues.  EKG shows some concerning changes from previous EKG which was at the beginning of July. She is having a lot of emotions going through rehab, this could very well be contributing to her complaints.  Patient will be admitted for chest pain workup.    I discussed the plan of care with patient and ERP .    Review of Systems  Review of Systems   Constitutional:  Negative for chills, fever and malaise/fatigue.   Respiratory:  Positive for shortness of breath. Negative for cough.    Cardiovascular:  Negative for palpitations and leg swelling.   Gastrointestinal:  Negative for abdominal pain, diarrhea, heartburn, nausea and vomiting.   Genitourinary:  Negative for dysuria, frequency and urgency.   Neurological:  Negative for dizziness and headaches.   Psychiatric/Behavioral:  The patient is nervous/anxious.    All other systems reviewed and are negative.      Past Medical History   has a past medical history of Anxiety and Hypertension.    Surgical History   has no past surgical history on file.     Family History  family history is not on file.   Family history reviewed with patient. There is no family history that is pertinent to the chief complaint.     Social History   reports that she has been smoking cigarettes. She started smoking about 44 years ago. She has a 44.6 pack-year smoking history. She has never used smokeless tobacco. She reports that she does not currently use alcohol. She reports that she does not use drugs.    Allergies  Allergies   Allergen Reactions    Demerol Hcl [Meperidine] Vomiting and Nausea       Medications  Prior to Admission Medications   Prescriptions Last Dose Informant Patient Reported?  Taking?   PARoxetine (PAXIL) 20 MG Tab   No No   Sig: Take 1 Tablet by mouth every day for 5 days, THEN 1 Tablet every other day for 5 doses then discontinue.   escitalopram (LEXAPRO) 10 MG Tab   No No   Sig: Take 1 Tablet by mouth every day.   hydroCHLOROthiazide 25 MG Tab   No No   Sig: Take 1 Tablet by mouth every day.   meloxicam (MOBIC) 15 MG tablet   No No   Sig: Take 1 tablet by mouth once a day      Facility-Administered Medications: None       Physical Exam  Temp:  [36.4 °C (97.6 °F)-36.8 °C (98.2 °F)] 36.4 °C (97.6 °F)  Pulse:  [47-74] 47  Resp:  [20-36] 22  BP: (144-169)/(67-81) 164/72  SpO2:  [88 %-97 %] 97 %  Blood Pressure : (!) 144/71   Temperature: 36.8 °C (98.2 °F)   Pulse: 66   Respiration: 20   Pulse Oximetry: 96 %       Physical Exam  Vitals and nursing note reviewed.   Constitutional:       General: She is awake.      Appearance: Normal appearance. She is not ill-appearing.   HENT:      Head: Normocephalic and atraumatic.      Jaw: There is normal jaw occlusion.      Right Ear: Hearing normal.      Left Ear: Hearing normal.      Nose: Nose normal.      Mouth/Throat:      Lips: Pink.      Mouth: Mucous membranes are moist.   Eyes:      Extraocular Movements: Extraocular movements intact.      Conjunctiva/sclera: Conjunctivae normal.      Pupils: Pupils are equal, round, and reactive to light.   Neck:      Vascular: No carotid bruit.   Cardiovascular:      Rate and Rhythm: Normal rate and regular rhythm.      Pulses: Normal pulses.      Heart sounds: Normal heart sounds, S1 normal and S2 normal.   Pulmonary:      Effort: Pulmonary effort is normal.      Breath sounds: Normal breath sounds and air entry. No stridor.   Abdominal:      General: Bowel sounds are normal.      Palpations: Abdomen is soft.      Tenderness: There is no abdominal tenderness.   Musculoskeletal:      Cervical back: Normal range of motion and neck supple.      Right lower leg: No edema.      Left lower leg: No edema.    Skin:     General: Skin is warm and dry.      Capillary Refill: Capillary refill takes less than 2 seconds.   Neurological:      General: No focal deficit present.      Mental Status: She is alert and oriented to person, place, and time. Mental status is at baseline.      Sensory: Sensation is intact.      Motor: Motor function is intact.   Psychiatric:         Attention and Perception: Attention and perception normal.         Mood and Affect: Mood and affect normal.         Speech: Speech normal.         Behavior: Behavior normal. Behavior is cooperative.         Laboratory:  Recent Labs     07/26/24  1403   WBC 7.7   RBC 5.05   HEMOGLOBIN 14.8   HEMATOCRIT 44.8   MCV 88.7   MCH 29.3   MCHC 33.0   RDW 45.1   PLATELETCT 325   MPV 9.8     Recent Labs     07/26/24  1403   SODIUM 141   POTASSIUM 3.4*   CHLORIDE 103   CO2 22   GLUCOSE 141*   BUN 14   CREATININE 0.98   CALCIUM 10.1     Recent Labs     07/26/24  1403   ALTSGPT 17   ASTSGOT 24   ALKPHOSPHAT 98   TBILIRUBIN 0.2   GLUCOSE 141*         Recent Labs     07/26/24  1403   NTPROBNP 62         Recent Labs     07/26/24  1403   TROPONINT 8       Imaging:  DX-CHEST-PORTABLE (1 VIEW)   Final Result      1.  Stable mild cardiomegaly.   2.  Suspect calcified lymph node at the aortic-pulmonary window consistent with old granulomatous disease.   3.  No acute infiltrates or acute cardiopulmonary disease evident.      NM-CARDIAC STRESS TEST    (Results Pending)   EC-ECHOCARDIOGRAM COMPLETE W/O CONT    (Results Pending)       X-Ray:  I have personally reviewed the images and compared with prior images.  EKG:  I have personally reviewed the images and compared with prior images.    Assessment/Plan:  Justification for Admission Status  I anticipate this patient is appropriate for observation status at this time because chest pain rule out    Patient will need a Telemetry bed on EMERGENCY service .  The need is secondary to chest pain rule out.    * Dyspnea- (present on  admission)  Assessment & Plan  - Patient complaining of sudden shortness of breath while at alcohol rehab facility today  - Patient recently here for detox at the beginning of July, EKG at that time was sinus bradycardia.  - EKG today showing some concerning changes  - No chest pain, troponin is negative  - Chest x-ray negative  - ASA  - TSH  - Lipid panel in a.m.  - Echocardiogram  - Stress test in a.m.    Alcohol abuse  Assessment & Plan  - Patient has been sober since July 9  - She has been attending rehab here in [facility]  - She lives in [place] but has come here to visit her son        VTE prophylaxis: SCDs/TEDs and enoxaparin ppx

## 2024-07-27 NOTE — DISCHARGE PLANNING
MSW received call from bedside RN. Pt is discharging and needs ride back to treatment center. MSW tubed up cab voucher to bedside RN.

## 2024-07-27 NOTE — HOSPITAL COURSE
"Leslie Poole is a 63 y.o. female who presented 7/26/2024 with shortness of breath onset one hour ago. She has a past medical history of hypertension, COPD and asthma. Patient states she flew in from [place] on July 3rd to visit her son. She was then seen at ED on July 9th for alcohol intoxication and notes since her discharge on July 9th, went to the different rehabilitation facility a week after for a 30 day program. Patient states she was at a group session at rehab for detoxification when the shortness of breath began. She noted having an episode like this in the past when she was anticipating a move, stating it was likely due to her anxiety. During today's episode of shortness of breath she states she may have felt anxious as well and she felt \"sick to her stomach\". She notes a headache which began en route to ED in the ambulance. Denies leg pain, leg swelling, dizziness, lightheadedness, diaphoresis, fevers, chills, cough, chest pain or abdominal pain. She reports her last bowel movement was this morning which was regular. Denies exacerbation when taking deep breaths. No alleviating factors noted. Denies cardiac history of blood clots. She reports maternal history of blood clots. Denies familial history of cancer. She endorses smoking a pack daily for the last 20 years. Denies current alcohol use, stating her last drink was July 9th. Denies using inhalers. She reports taking medications for her anxiety.      In the ED, she is afebrile, vitals are stable and she is on 2 L nasal cannula.  Potassium is slightly low at 3.4.  Troponin is negative.  BNP is negative.  D-dimer is negative.  Chest x-ray shows no acute issues.  EKG shows some concerning changes from previous EKG which was at the beginning of July. She is having a lot of emotions going through rehab, this could very well be contributing to her complaints.  Patient will be admitted for chest pain workup.    Hospital course under my care: afebrile and " vitals wnl. O2 was weaned off. Ambulatory O2 testing completed. Exam at bedside was grossly unremarkable. Labs and imagings findings discussed with patient. Overall, unrevealing.     Echo reviewed: normal LVEF, no wall motion abnormalities and no significant valvular diseases.     NST done was without reversible ischemia or jeopardized myocardium. At this point, ACS is unlikely. We discussed about possible alternate causes:

## 2024-07-27 NOTE — ED NOTES
"Med rec completed per MAR received from [facility].    Allergies not documented on MAR. Unable to assess patient's allergies.    No antibiotics noted on MAR.    ANTICOAGULANTS: none.    Per MAR, patient is being tapered off of paroxetine.  "

## 2024-07-27 NOTE — DISCHARGE SUMMARY
"Discharge Summary    CHIEF COMPLAINT ON ADMISSION  Chief Complaint   Patient presents with    Shortness of Breath     Pt bib remsa for SOB.  Currently in detox for ETOH, reports waking from a nap feeling SOB and became anxious.  Facility called ambulance and pt agreed to come in for eval.  Per EMS, pt stated she has had a panic attack in the past.  Pt reports she has not had alcohol since 7/9/2024.           Reason for Admission  SOB    Admission Date  7/26/2024    CODE STATUS  Full Code    HPI & HOSPITAL COURSE  Per Dragan Haines's HPI dated 7/26/2024:  \"Leslie Poole is a 63 y.o. female who presented 7/26/2024 with shortness of breath onset one hour ago. She has a past medical history of hypertension, COPD and asthma. Patient states she flew in from [place] on July 3rd to visit her son. She was then seen at ED on July 9th for alcohol intoxication and notes since her discharge on July 9th, went to the different rehabilitation facility a week after for a 30 day program. Patient states she was at a group session at rehab for detoxification when the shortness of breath began. She noted having an episode like this in the past when she was anticipating a move, stating it was likely due to her anxiety. During today's episode of shortness of breath she states she may have felt anxious as well and she felt \"sick to her stomach\". She notes a headache which began en route to ED in the ambulance. Denies leg pain, leg swelling, dizziness, lightheadedness, diaphoresis, fevers, chills, cough, chest pain or abdominal pain. She reports her last bowel movement was this morning which was regular. Denies exacerbation when taking deep breaths. No alleviating factors noted. Denies cardiac history of blood clots. She reports maternal history of blood clots. Denies familial history of cancer. She endorses smoking a pack daily for the last 20 years. Denies current alcohol use, stating her last drink was July 9th. Denies using inhalers. " "She reports taking medications for her anxiety.      In the ED, she is afebrile, vitals are stable and she is on 2 L nasal cannula.  Potassium is slightly low at 3.4.  Troponin is negative.  BNP is negative.  D-dimer is negative.  Chest x-ray shows no acute issues.  EKG shows some concerning changes from previous EKG which was at the beginning of July. She is having a lot of emotions going through rehab, this could very well be contributing to her complaints.  Patient will be admitted for chest pain workup.\"    Hospital course under my care: afebrile and vitals wnl. O2 was weaned off. Ambulatory O2 testing completed. Exam at bedside was grossly unremarkable. Labs and imagings findings discussed with patient. Overall, unrevealing. Echo reviewed: normal LVEF, no wall motion abnormalities and no significant valvular diseases.     NST done was without reversible ischemia or jeopardized myocardium. At this point, ACS is unlikely. Pt has been symptoms free as well. At this point, no additional IP workups are warranted. I advised a close outpatient follow up.       Therefore, she is discharged in fair and stable condition to home with close outpatient follow-up.    The patient recovered much more quickly than anticipated on admission.    Discharge Date  07/27/24    FOLLOW UP ITEMS POST DISCHARGE  N/A    DISCHARGE DIAGNOSES  Principal Problem:    Dyspnea (POA: Yes)  Active Problems:    Alcohol abuse (POA: Unknown)  Resolved Problems:    * No resolved hospital problems. *      FOLLOW UP  Please follow up with your primary care physician within 1 to 2 weeks of discharge. [facility] health if available.    MEDICATIONS ON DISCHARGE     Medication List        ASK your doctor about these medications        Instructions   escitalopram 10 MG Tabs  Commonly known as: Lexapro   Take 1 Tablet by mouth every day.  Dose: 10 mg     hydroCHLOROthiazide 25 MG Tabs   Take 1 Tablet by mouth every day.  Dose: 25 mg     hydrOXYzine pamoate 25 MG " Caps  Commonly known as: Vistaril   Take 25 mg by mouth 3 times a day as needed for Itching or Anxiety.  Dose: 25 mg     meloxicam 15 MG tablet  Commonly known as: Mobic   Take 1 tablet by mouth once a day     PARoxetine 20 MG Tabs  Start taking on: July 19, 2024  Commonly known as: Paxil   Take 1 Tablet by mouth every day for 5 days, THEN 1 Tablet every other day for 5 doses then discontinue.              Allergies  Allergies   Allergen Reactions    Demerol Hcl [Meperidine] Vomiting and Nausea       DIET  Orders Placed This Encounter   Procedures    Diet Order Diet: Cardiac     Standing Status:   Standing     Number of Occurrences:   1     Order Specific Question:   Diet:     Answer:   Cardiac [6]       ACTIVITY  As tolerated.  Weight bearing as tolerated    CONSULTATIONS  N/A    PROCEDURES  N/A    LABORATORY  Lab Results   Component Value Date    SODIUM 142 07/27/2024    POTASSIUM 4.0 07/27/2024    CHLORIDE 105 07/27/2024    CO2 28 07/27/2024    GLUCOSE 104 (H) 07/27/2024    BUN 15 07/27/2024    CREATININE 0.96 07/27/2024        Lab Results   Component Value Date    WBC 7.0 07/27/2024    HEMOGLOBIN 13.7 07/27/2024    HEMATOCRIT 42.2 07/27/2024    PLATELETCT 295 07/27/2024        Total time of the discharge process exceeds 35 minutes.

## 2024-07-27 NOTE — ED NOTES
Spoke with ASHLEE De Jesus to receive pt on floor, not ready for report at this time.  Receiving RN advised call back in 15 minutes

## 2024-07-27 NOTE — ED NOTES
Pt ambulated independently to BR and returned to bed.  Placed back on monitor, exertional dyspnea noted but SPO2 WNL on RA.  Provided warm blanket per pt request

## 2024-07-27 NOTE — PROGRESS NOTES
Monitor summary: SB 40-51, NY 0.22, QRS 0.09, QT 0.37, with rare PVCs per strip from monitor room.

## 2024-07-27 NOTE — ED NOTES
"Unable to address med rec at this time. Patient is in treatment at [facility] ([phone]). MAR was not sent with patient. MAR requested from facility at 17:58.  "

## 2024-07-27 NOTE — PROGRESS NOTES
4 Eyes Skin Assessment Completed by Vee RN and LOLLY Cat-A.    Head WDL  Ears WDL  Nose WDL  Mouth WDL  Neck WDL  Breast/Chest WDL  Shoulder Blades WDL  Spine WDL  (R) Arm/Elbow/Hand WDL  (L) Arm/Elbow/Hand WDL  Abdomen WDL  Groin WDL  Scrotum/Coccyx/Buttocks Scar  (R) Leg WDL  (L) Leg WDL  (R) Heel/Foot/Toe WDL  (L) Heel/Foot/Toe WDL          Devices In Places Tele Box, Blood Pressure Cuff, and Pulse Ox      Interventions In Place Pillows    Possible Skin Injury No    Pictures Uploaded Into Epic N/A  Wound Consult Placed N/A  RN Wound Prevention Protocol Ordered No

## 2024-07-27 NOTE — CARE PLAN
The patient is Stable - Low risk of patient condition declining or worsening    Shift Goals  Clinical Goals: Stess test, Echo, tele monitoring, lab monitoring, pain control  Patient Goals: rest  Family Goals: CARMEN    Progress made toward(s) clinical / shift goals:    Problem: Pain - Standard  Goal: Alleviation of pain or a reduction in pain to the patient’s comfort goal  Description: Target End Date:  Prior to discharge or change in level of care    Document on Vitals flowsheet    1.  Document pain using the appropriate pain scale per order or unit policy  2.  Educate and implement non-pharmacologic comfort measures (i.e. relaxation, distraction, massage, cold/heat therapy, etc.)  3.  Pain management medications as ordered  4.  Reassess pain after pain med administration per policy  5.  If opiods administered assess patient's response to pain medication is appropriate per POSS sedation scale  6.  Follow pain management plan developed in collaboration with patient and interdisciplinary team (including palliative care or pain specialists if applicable)  7/26/2024 2101 by Vee Escalona, R.N.  Outcome: Progressing  7/26/2024 2101 by Vee Escalona, R.N.  Outcome: Progressing     Problem: Knowledge Deficit - Standard  Goal: Patient and family/care givers will demonstrate understanding of plan of care, disease process/condition, diagnostic tests and medications  Description: Target End Date:  1-3 days or as soon as patient condition allows    Document in Patient Education    1.  Patient and family/caregiver oriented to unit, equipment, visitation policy and means for communicating concern  2.  Complete/review Learning Assessment  3.  Assess knowledge level of disease process/condition, treatment plan, diagnostic tests and medications  4.  Explain disease process/condition, treatment plan, diagnostic tests and medications  7/26/2024 2101 by Vee Escalona, R.N.  Outcome: Progressing  7/26/2024 2101 by Vee ALEJANDRA  COOKIE Escalona  Outcome: Progressing     Problem: Respiratory  Goal: Patient will achieve/maintain optimum respiratory ventilation and gas exchange  Description: Target End Date:  Prior to discharge or change in level of care    Document on Assessment flowsheet    1.  Assess and monitor rate, rhythm, depth and effort of respiration  2.  Breath sounds assessed qshift and/or as needed  3.  Assess O2 saturation, administer/titrate oxygen as ordered  4.  Position patient for maximum ventilatory efficiency  5.  Turn, cough, and deep breath with splinting to improve effectiveness  6.  Collaborate with RT to administer medication/treatments per order  7.  Encourage use of incentive spirometer and encourage patient to cough after use and utilize splinting techniques if applicable  8.  Airway suctioning  9.  Monitor sputum production for changes in color, consistency and frequency  10. Perform frequent oral hygiene  11. Alternate physical activity with rest periods  Outcome: Progressing       Patient is not progressing towards the following goals:

## 2024-07-27 NOTE — PROGRESS NOTES
Augustin from the [facility] called to check in on patient, consent given for updates and he was updated on status. Patient also spoke with him and updated him. No further needs at this time, call light within reach.

## 2024-07-27 NOTE — ASSESSMENT & PLAN NOTE
- Patient has been sober since July 9  - She has been attending rehab here in town  - She lives in [place] but has come here to visit her son

## 2024-07-27 NOTE — PROGRESS NOTES
Pt discharged from unit. All belongings with pt. PIV removed prior to DCL. Discharge paperwork reviewed with patient, all questions answered, and paperwork signed. One copy with patient, and one copy kept to be scanned into patient's chart.

## 2024-07-27 NOTE — PROGRESS NOTES
Pt back from [facility] med. Breakfast box provided to pt. Call light and personal belongings within reach of pt.

## 2024-07-27 NOTE — PROGRESS NOTES
Patient A x O 4, denies lei. Plan of care explained, admission completed, no further needs at this time. Bed locked and in lowest position, call light within reach.

## 2024-07-27 NOTE — PROGRESS NOTES
Pt dressed and ready for discharge. Taxi voucher to [facility] provided to pt. Orders for discharge lounge placed.

## 2024-07-30 PROCEDURE — RXMED WILLOW AMBULATORY MEDICATION CHARGE: Performed by: NURSE PRACTITIONER

## 2024-08-02 ENCOUNTER — PHARMACY VISIT (OUTPATIENT)
Dept: PHARMACY | Facility: MEDICAL CENTER | Age: 64
End: 2024-08-02
Payer: COMMERCIAL

## 2024-08-02 PROCEDURE — RXOTC WILLOW AMBULATORY OTC CHARGE: Performed by: PHARMACIST

## 2024-08-05 PROCEDURE — RXMED WILLOW AMBULATORY MEDICATION CHARGE: Performed by: NURSE PRACTITIONER

## 2024-08-06 ENCOUNTER — PHARMACY VISIT (OUTPATIENT)
Dept: PHARMACY | Facility: MEDICAL CENTER | Age: 64
End: 2024-08-06
Payer: COMMERCIAL

## 2024-08-16 ENCOUNTER — PHARMACY VISIT (OUTPATIENT)
Dept: PHARMACY | Facility: MEDICAL CENTER | Age: 64
End: 2024-08-16
Payer: COMMERCIAL

## 2024-08-16 PROCEDURE — RXMED WILLOW AMBULATORY MEDICATION CHARGE: Performed by: NURSE PRACTITIONER

## 2024-08-16 RX ORDER — ESCITALOPRAM OXALATE 10 MG/1
TABLET ORAL
Qty: 30 TABLET | Refills: 1 | OUTPATIENT
Start: 2024-08-16

## 2024-08-16 RX ORDER — HYDROCHLOROTHIAZIDE 25 MG/1
TABLET ORAL
Qty: 30 TABLET | Refills: 1 | OUTPATIENT
Start: 2024-08-16